# Patient Record
Sex: FEMALE | Race: WHITE
[De-identification: names, ages, dates, MRNs, and addresses within clinical notes are randomized per-mention and may not be internally consistent; named-entity substitution may affect disease eponyms.]

---

## 2018-06-27 ENCOUNTER — HOSPITAL ENCOUNTER (OUTPATIENT)
Dept: HOSPITAL 89 - ZZSENDIN | Age: 83
End: 2018-06-27
Attending: PHYSICIAN ASSISTANT
Payer: COMMERCIAL

## 2018-06-27 DIAGNOSIS — Z77.21: Primary | ICD-10-CM

## 2018-06-27 PROCEDURE — 86703 HIV-1/HIV-2 1 RESULT ANTBDY: CPT

## 2018-06-27 PROCEDURE — 86706 HEP B SURFACE ANTIBODY: CPT

## 2018-06-27 PROCEDURE — 86803 HEPATITIS C AB TEST: CPT

## 2019-02-28 ENCOUNTER — HOSPITAL ENCOUNTER (OUTPATIENT)
Dept: HOSPITAL 89 - OR | Age: 84
Discharge: HOME | End: 2019-02-28
Attending: FAMILY MEDICINE
Payer: COMMERCIAL

## 2019-02-28 VITALS — SYSTOLIC BLOOD PRESSURE: 149 MMHG | DIASTOLIC BLOOD PRESSURE: 58 MMHG

## 2019-02-28 VITALS — SYSTOLIC BLOOD PRESSURE: 142 MMHG | DIASTOLIC BLOOD PRESSURE: 80 MMHG

## 2019-02-28 VITALS — DIASTOLIC BLOOD PRESSURE: 85 MMHG | SYSTOLIC BLOOD PRESSURE: 139 MMHG

## 2019-02-28 VITALS — SYSTOLIC BLOOD PRESSURE: 177 MMHG | DIASTOLIC BLOOD PRESSURE: 77 MMHG

## 2019-02-28 VITALS — WEIGHT: 118 LBS | HEIGHT: 55 IN | BODY MASS INDEX: 27.31 KG/M2

## 2019-02-28 VITALS — DIASTOLIC BLOOD PRESSURE: 67 MMHG | SYSTOLIC BLOOD PRESSURE: 126 MMHG

## 2019-02-28 VITALS — DIASTOLIC BLOOD PRESSURE: 80 MMHG | SYSTOLIC BLOOD PRESSURE: 143 MMHG

## 2019-02-28 VITALS — SYSTOLIC BLOOD PRESSURE: 136 MMHG | DIASTOLIC BLOOD PRESSURE: 62 MMHG

## 2019-02-28 DIAGNOSIS — K57.30: ICD-10-CM

## 2019-02-28 DIAGNOSIS — K64.4: Primary | ICD-10-CM

## 2019-02-28 PROCEDURE — 45378 DIAGNOSTIC COLONOSCOPY: CPT

## 2019-02-28 PROCEDURE — 00811 ANES LWR INTST NDSC NOS: CPT

## 2019-02-28 NOTE — NUR
0905 PT ARRIVED TO SD VIA CART, SAFETY MAINTAINED, VSS, RESTING, DAUGHTER NITHIN AT 
BEDSIDE. 

0915 VSS, STILL RESTING IN LEFT LAT POSITION, DOWN TO 2L MASK

0930 DOWN TO ROOM AIR, DAUGHTER STEPPED OUT TO EAT BREAKFAST, WILL CALL HER WHEN PT IS READY 
IF SHE'S NOT BACK

0940 PT TOLERATING WATER AND APPLE SAUCE, 

1013 ORTHOSTATICS STABLE, PT DENIES DIZZINESS, PT ALLOWED TO DRESS, CALLED DAUGHTER, D/C 
FROM IV TUBING

1015 DC INSTRUCTIONS COVERED WITH PT

1030 CALLED DAUGHTER AGAIN, ON HER WAY UP, INSTRUCTIONS COVERED WITH DAUGHTER, IV OUT, 
PRESSURE DRESSING APPLIED

1040 PT OUT TO CAR BY FOOT, USES CANE, STEADY ON FEET. ALL BELONGINGS WITH PT. SELF 
TRANSFERRED TO CAR OUTSIDE OF ADMISSIONS ENTRANCE 1047